# Patient Record
Sex: FEMALE | URBAN - METROPOLITAN AREA
[De-identification: names, ages, dates, MRNs, and addresses within clinical notes are randomized per-mention and may not be internally consistent; named-entity substitution may affect disease eponyms.]

---

## 2017-11-09 ENCOUNTER — IMPORTED ENCOUNTER (OUTPATIENT)
Dept: URBAN - METROPOLITAN AREA CLINIC 9 | Facility: CLINIC | Age: 77
End: 2017-11-09

## 2017-11-28 ENCOUNTER — IMPORTED ENCOUNTER (OUTPATIENT)
Dept: URBAN - METROPOLITAN AREA CLINIC 9 | Facility: CLINIC | Age: 77
End: 2017-11-28

## 2018-10-02 ENCOUNTER — IMPORTED ENCOUNTER (OUTPATIENT)
Dept: URBAN - METROPOLITAN AREA CLINIC 9 | Facility: CLINIC | Age: 78
End: 2018-10-02

## 2019-02-14 ENCOUNTER — IMPORTED ENCOUNTER (OUTPATIENT)
Dept: URBAN - METROPOLITAN AREA CLINIC 9 | Facility: CLINIC | Age: 79
End: 2019-02-14

## 2019-02-19 ENCOUNTER — IMPORTED ENCOUNTER (OUTPATIENT)
Dept: URBAN - METROPOLITAN AREA CLINIC 9 | Facility: CLINIC | Age: 79
End: 2019-02-19

## 2019-10-29 ENCOUNTER — IMPORTED ENCOUNTER (OUTPATIENT)
Dept: URBAN - METROPOLITAN AREA CLINIC 9 | Facility: CLINIC | Age: 79
End: 2019-10-29

## 2020-01-21 ENCOUNTER — IMPORTED ENCOUNTER (OUTPATIENT)
Dept: URBAN - METROPOLITAN AREA CLINIC 9 | Facility: CLINIC | Age: 80
End: 2020-01-21

## 2020-04-30 NOTE — PATIENT DISCUSSION
RX EMYCIN RUBEN QHS. INSERTION OF BANDAGE CONTACT LENS, IN OFFICE FOR PATIENT'S DISCOMFORT. CONTINUE TO MONITOR. RETURN FOR FOLLOW-UP AS SCHEDULED.

## 2020-05-04 NOTE — PATIENT DISCUSSION
Continue: Maxitrol (neomycin-polymyxin-dexameth): ointment: 3.5-10,000-0.1 mg-unit/g-% a small amount twice a day as directed on eyelid

## 2020-05-04 NOTE — PATIENT DISCUSSION
CORNEAL ABRASION; OD; OCUFLOX QID, EMYCIN RUBEN QHS. CONTINUE TO MONITOR. RETURN FOR FOLLOW-UP AS SCHEDULED.

## 2020-05-11 NOTE — PATIENT DISCUSSION
CORNEAL ABRASION; OD, RESOLVED. CONTINUE GTTS QID THROUGH TODAY. CONTINUE EMYCIN RUBEN THROUGH FRIDAY NIGHT. CONTINUE TO MONITOR. RETURN FOR FOLLOW-UP AS SCHEDULED.

## 2020-06-15 NOTE — PATIENT DISCUSSION
Stopped Today: Maxitrol (neomycin-polymyxin-dexameth): ointment: 3.5-10,000-0.1 mg-unit/g-% a small amount twice a day as directed on eyelid

## 2020-06-15 NOTE — PATIENT DISCUSSION
CORNEAL ABRASION; OD, HEALED. PT EDUCATION. MONITOR. RTO AS SCHEDULED. CONTINUE TO MONITOR. RETURN FOR FOLLOW-UP AS SCHEDULED.

## 2020-06-15 NOTE — PATIENT DISCUSSION
Stopped Today: erythromycin (erythromycin): ointment: 5 mg/gram (0.5 %) a small amount at bedtime into affected eye 04-

## 2021-10-19 ASSESSMENT — VISUAL ACUITY
OS_CC: 20/30 - SN
OS_CC: 20/30 - SN
OD_CC: 20/20 SN
OD_CC: 20/25 - SN
OS_CC: 20/20 SN
OS_CC: 20/25 -2 SN
OS_CC: 20/20 - SN
OD_CC: 20/20 -2 SN
OS_CC: 20/25 SN
OD_CC: 20/20 SN
OD_CC: 20/20 -2 SN
OD_CC: 20/30 + SN
OD_CC: 20/20 SN
OD_CC: 20/25 SN
OD_CC: 20/20 SN
OS_CC: 20/25 SN
OS_CC: 20/40 SN

## 2021-10-19 ASSESSMENT — PACHYMETRY
OS_CT_UM: 589.0
OD_CT_UM: 605.0

## 2021-10-19 ASSESSMENT — TONOMETRY
OS_IOP_MMHG: 19
OD_IOP_MMHG: 18
OD_IOP_MMHG: 19
OD_IOP_MMHG: 17
OS_IOP_MMHG: 20
OS_IOP_MMHG: 17
OD_IOP_MMHG: 22
OD_IOP_MMHG: 16
OS_IOP_MMHG: 20
OS_IOP_MMHG: 17

## 2022-06-19 RX ORDER — CLOPIDOGREL BISULFATE 75 MG/1
TABLET ORAL
COMMUNITY

## 2022-06-19 RX ORDER — TEMAZEPAM 15 MG/1
CAPSULE ORAL
COMMUNITY

## 2022-06-19 RX ORDER — BIMATOPROST 0.01 %
DROPS OPHTHALMIC (EYE)
COMMUNITY

## 2022-06-19 RX ORDER — CETIRIZINE HYDROCHLORIDE 10 MG/1
TABLET ORAL
COMMUNITY

## 2022-06-19 RX ORDER — INSULIN GLARGINE 100 [IU]/ML
INJECTION, SOLUTION SUBCUTANEOUS
COMMUNITY

## 2022-06-19 RX ORDER — BRINZOLAMIDE/BRIMONIDINE TARTRATE 10; 2 MG/ML; MG/ML
SUSPENSION/ DROPS OPHTHALMIC
COMMUNITY

## 2022-06-19 RX ORDER — LORATADINE 10 MG/1
TABLET ORAL
COMMUNITY

## 2022-06-19 RX ORDER — TELMISARTAN 40 MG/1
TABLET ORAL
COMMUNITY

## 2022-06-19 RX ORDER — PANTOPRAZOLE SODIUM 20 MG/1
TABLET, DELAYED RELEASE ORAL
COMMUNITY

## 2022-06-19 RX ORDER — ATORVASTATIN CALCIUM 10 MG/1
TABLET, FILM COATED ORAL
COMMUNITY

## 2022-06-19 RX ORDER — ALBUTEROL SULFATE 90 UG/1
AEROSOL, METERED RESPIRATORY (INHALATION)
COMMUNITY

## 2022-06-19 RX ORDER — LIDOCAINE 50 MG/G
PATCH TOPICAL
COMMUNITY

## 2022-06-19 RX ORDER — INSULIN ASPART 100 [IU]/ML
INJECTION, SOLUTION INTRAVENOUS; SUBCUTANEOUS
COMMUNITY

## 2022-07-09 NOTE — PATIENT DISCUSSION
Corneal Abrasion Counseling: I have explained to the patient that most minor corneal abrasions fully recover without permanent eye damage. I have further explained that deeper scratches can cause corneal infections, erosion, chronic loose epithelium or scarring if not treated properly. These complications can result in long-term vision problems. I have explained that proper healing is dependent on patient compliance of the treatment prescribed and to follow up as scheduled. It was explained that a bandage contact lens may increase the possibility of an infection and the alternative treatments including patching or lubrication alone were provided to the patient. The patient has elected to proceed with the bandage contact lens for comfort and convenience. The patient was instructed to keep water and any material including makeup if applicable out of the affected eye. The patient has been instructed to report any unusual symptoms during or following healing to include a recurrence of pain. No